# Patient Record
Sex: MALE | Race: WHITE | NOT HISPANIC OR LATINO | URBAN - METROPOLITAN AREA
[De-identification: names, ages, dates, MRNs, and addresses within clinical notes are randomized per-mention and may not be internally consistent; named-entity substitution may affect disease eponyms.]

---

## 2020-06-03 ENCOUNTER — LAB OUTSIDE AN ENCOUNTER (OUTPATIENT)
Dept: URBAN - METROPOLITAN AREA CLINIC 115 | Facility: CLINIC | Age: 75
End: 2020-06-03

## 2020-06-03 ENCOUNTER — OFFICE VISIT (OUTPATIENT)
Dept: URBAN - METROPOLITAN AREA CLINIC 115 | Facility: CLINIC | Age: 75
End: 2020-06-03
Payer: MEDICARE

## 2020-06-03 DIAGNOSIS — D64.89 ANEMIA DUE TO OTHER CAUSE: ICD-10-CM

## 2020-06-03 DIAGNOSIS — R18.8 ASCITES: ICD-10-CM

## 2020-06-03 PROCEDURE — G9903 PT SCRN TBCO ID AS NON USER: HCPCS | Performed by: INTERNAL MEDICINE

## 2020-06-03 PROCEDURE — G8417 CALC BMI ABV UP PARAM F/U: HCPCS | Performed by: INTERNAL MEDICINE

## 2020-06-03 PROCEDURE — G8753 SYS BP > OR = 140: HCPCS | Performed by: INTERNAL MEDICINE

## 2020-06-03 PROCEDURE — G8427 DOCREV CUR MEDS BY ELIG CLIN: HCPCS | Performed by: INTERNAL MEDICINE

## 2020-06-03 PROCEDURE — 99214 OFFICE O/P EST MOD 30 MIN: CPT | Performed by: INTERNAL MEDICINE

## 2020-06-03 RX ORDER — SPIRONOLACTONE 25 MG/1
TABLET ORAL
Qty: 0 | Refills: 0 | Status: ACTIVE | COMMUNITY
Start: 1900-01-01

## 2020-06-03 RX ORDER — METFORMIN HYDROCHLORIDE 500 MG/1
TABLET, COATED ORAL
Qty: 0 | Refills: 0 | Status: ACTIVE | COMMUNITY
Start: 1900-01-01

## 2020-06-03 RX ORDER — RIOCIGUAT 1 MG/1
TABLET, FILM COATED ORAL
Qty: 0 | Refills: 0 | Status: ACTIVE | COMMUNITY
Start: 1900-01-01

## 2020-06-03 RX ORDER — ASPIRIN 81 MG/1
TABLET, CHEWABLE ORAL
Qty: 0 | Refills: 0 | Status: ACTIVE | COMMUNITY
Start: 1900-01-01

## 2020-06-03 RX ORDER — ALLOPURINOL 100 MG/1
TABLET ORAL
Qty: 0 | Refills: 0 | Status: ACTIVE | COMMUNITY
Start: 1900-01-01

## 2020-06-03 RX ORDER — FUROSEMIDE 40 MG/1
TABLET ORAL
Qty: 0 | Refills: 0 | Status: ACTIVE | COMMUNITY
Start: 1900-01-01

## 2020-06-03 RX ORDER — METOLAZONE 5 MG/1
TABLET ORAL
Qty: 0 | Refills: 0 | Status: ACTIVE | COMMUNITY
Start: 1900-01-01

## 2020-06-03 RX ORDER — RANOLAZINE 500 MG/1
TABLET, FILM COATED, EXTENDED RELEASE ORAL
Qty: 0 | Refills: 0 | Status: ACTIVE | COMMUNITY
Start: 1900-01-01

## 2020-06-03 RX ORDER — COLCHICINE 0.6 MG/1
TABLET, FILM COATED ORAL
Qty: 0 | Refills: 0 | Status: ACTIVE | COMMUNITY
Start: 1900-01-01

## 2020-06-03 NOTE — HPI-TODAY'S VISIT:
LAST SIX MONTH NOTHING NEW. HAVING ECHOCARDIOGRAM, DR CESPEDES IS DOING IT.  NO STOMACH ISSUE. NO BLEEDING. BLEEDING IN NOSE.  NO ABDOMINAL PAIN OR DISTENSION., NOTICED TO HAVE DISTENSION OF ABDOMEN, AND SOB, THINKS PARACENTESIS WILL HELP, LAST ONE IN [place].

## 2020-06-03 NOTE — PHYSICAL EXAM CHEST:
no lesions,  no deformities,  no traumatic injuries,  no significant scars are present,  chest wall non-tender,  no masses present,  tactile fremitus is normal, breathing is unlabored without accessory muscle use, normal breath sounds , no lesions,  no deformities,  no traumatic injuries,  no significant scars are present,  chest wall non-tender,  no masses present,  tactile fremitus is normal, breathing is unlabored without accessory muscle use, normal breath sounds

## 2020-06-04 LAB
BASO (ABSOLUTE): 0
BASOS: 1
EOS (ABSOLUTE): 0.2
EOS: 3
HEMATOCRIT: 30.1
HEMATOLOGY COMMENTS:: (no result)
HEMOGLOBIN: 9.2
IMMATURE CELLS: (no result)
IMMATURE GRANS (ABS): 0
IMMATURE GRANULOCYTES: 1
LYMPHS (ABSOLUTE): 0.9
LYMPHS: 15
MCH: 26.9
MCHC: 30.6
MCV: 88
MONOCYTES(ABSOLUTE): 0.7
MONOCYTES: 11
NEUTROPHILS (ABSOLUTE): 4.1
NEUTROPHILS: 69
NRBC: (no result)
PLATELETS: 146
RBC: 3.42
RDW: 16.2
WBC: 5.9

## 2020-12-04 ENCOUNTER — OUT OF OFFICE VISIT (OUTPATIENT)
Dept: URBAN - METROPOLITAN AREA MEDICAL CENTER 24 | Facility: MEDICAL CENTER | Age: 75
End: 2020-12-04
Payer: MEDICARE

## 2020-12-04 DIAGNOSIS — D64.89 ANEMIA DUE TO OTHER CAUSE: ICD-10-CM

## 2020-12-04 DIAGNOSIS — R79.1 ABNORMAL BLEEDING TIME: ICD-10-CM

## 2020-12-04 DIAGNOSIS — R18.8 ABDOMINAL FLUID COLLECTION: ICD-10-CM

## 2020-12-04 DIAGNOSIS — Z79.01 ANTICOAGULANT LONG-TERM USE: ICD-10-CM

## 2020-12-04 PROCEDURE — G8427 DOCREV CUR MEDS BY ELIG CLIN: HCPCS | Performed by: INTERNAL MEDICINE

## 2020-12-04 PROCEDURE — 99222 1ST HOSP IP/OBS MODERATE 55: CPT | Performed by: INTERNAL MEDICINE

## 2020-12-09 ENCOUNTER — TELEPHONE ENCOUNTER (OUTPATIENT)
Dept: URBAN - METROPOLITAN AREA CLINIC 82 | Facility: CLINIC | Age: 75
End: 2020-12-09

## 2020-12-21 ENCOUNTER — LAB OUTSIDE AN ENCOUNTER (OUTPATIENT)
Dept: URBAN - METROPOLITAN AREA CLINIC 82 | Facility: CLINIC | Age: 75
End: 2020-12-21

## 2020-12-21 ENCOUNTER — OFFICE VISIT (OUTPATIENT)
Dept: URBAN - METROPOLITAN AREA CLINIC 82 | Facility: CLINIC | Age: 75
End: 2020-12-21
Payer: MEDICARE

## 2020-12-21 ENCOUNTER — DASHBOARD ENCOUNTERS (OUTPATIENT)
Age: 75
End: 2020-12-21

## 2020-12-21 VITALS
RESPIRATION RATE: 16 BRPM | SYSTOLIC BLOOD PRESSURE: 102 MMHG | HEART RATE: 51 BPM | BODY MASS INDEX: 31.4 KG/M2 | WEIGHT: 231.8 LBS | HEIGHT: 72 IN | TEMPERATURE: 97.5 F | DIASTOLIC BLOOD PRESSURE: 65 MMHG

## 2020-12-21 DIAGNOSIS — I50.810 RIGHT HEART FAILURE: ICD-10-CM

## 2020-12-21 DIAGNOSIS — R18.8 ASCITES: ICD-10-CM

## 2020-12-21 DIAGNOSIS — K92.2 GI BLEEDING: ICD-10-CM

## 2020-12-21 DIAGNOSIS — N17.9 AKI (ACUTE KIDNEY INJURY): ICD-10-CM

## 2020-12-21 PROBLEM — 166603001 LIVER FUNCTION TESTS ABNORMAL: Status: ACTIVE | Noted: 2020-06-04

## 2020-12-21 PROBLEM — 389026000 ASCITES: Status: ACTIVE | Noted: 2020-06-03

## 2020-12-21 PROBLEM — 302215000 THROMBOCYTOPENIA: Status: ACTIVE | Noted: 2020-06-04

## 2020-12-21 PROBLEM — 128404006 RIGHT HEART FAILURE: Status: ACTIVE | Noted: 2020-06-03

## 2020-12-21 PROBLEM — 80515008 HEPATOMEGALY: Status: ACTIVE | Noted: 2020-06-04

## 2020-12-21 PROCEDURE — G9903 PT SCRN TBCO ID AS NON USER: HCPCS | Performed by: INTERNAL MEDICINE

## 2020-12-21 PROCEDURE — 3017F COLORECTAL CA SCREEN DOC REV: CPT | Performed by: INTERNAL MEDICINE

## 2020-12-21 PROCEDURE — G8427 DOCREV CUR MEDS BY ELIG CLIN: HCPCS | Performed by: INTERNAL MEDICINE

## 2020-12-21 PROCEDURE — 99214 OFFICE O/P EST MOD 30 MIN: CPT | Performed by: INTERNAL MEDICINE

## 2020-12-21 PROCEDURE — G8482 FLU IMMUNIZE ORDER/ADMIN: HCPCS | Performed by: INTERNAL MEDICINE

## 2020-12-21 PROCEDURE — 1036F TOBACCO NON-USER: CPT | Performed by: INTERNAL MEDICINE

## 2020-12-21 RX ORDER — METFORMIN HYDROCHLORIDE 500 MG/1
TABLET, COATED ORAL
Qty: 0 | Refills: 0 | Status: ACTIVE | COMMUNITY
Start: 1900-01-01

## 2020-12-21 RX ORDER — RANOLAZINE 500 MG/1
TABLET, FILM COATED, EXTENDED RELEASE ORAL
Qty: 0 | Refills: 0 | Status: ACTIVE | COMMUNITY
Start: 1900-01-01

## 2020-12-21 RX ORDER — COLCHICINE 0.6 MG/1
TABLET, FILM COATED ORAL
Qty: 0 | Refills: 0 | Status: ACTIVE | COMMUNITY
Start: 1900-01-01

## 2020-12-21 RX ORDER — SPIRONOLACTONE 25 MG/1
TABLET ORAL
Qty: 0 | Refills: 0 | Status: ACTIVE | COMMUNITY
Start: 1900-01-01

## 2020-12-21 RX ORDER — METOLAZONE 5 MG/1
TABLET ORAL
Qty: 0 | Refills: 0 | Status: ACTIVE | COMMUNITY
Start: 1900-01-01

## 2020-12-21 RX ORDER — FUROSEMIDE 40 MG/1
TABLET ORAL
Qty: 0 | Refills: 0 | Status: ACTIVE | COMMUNITY
Start: 1900-01-01

## 2020-12-21 RX ORDER — RIOCIGUAT 1 MG/1
TABLET, FILM COATED ORAL
Qty: 0 | Refills: 0 | Status: ACTIVE | COMMUNITY
Start: 1900-01-01

## 2020-12-21 RX ORDER — ASPIRIN 81 MG/1
TABLET, CHEWABLE ORAL
Qty: 0 | Refills: 0 | Status: ACTIVE | COMMUNITY
Start: 1900-01-01

## 2020-12-21 RX ORDER — ALLOPURINOL 100 MG/1
TABLET ORAL
Qty: 0 | Refills: 0 | Status: ACTIVE | COMMUNITY
Start: 1900-01-01

## 2020-12-21 NOTE — PHYSICAL EXAM CONSTITUTIONAL:
well developed, well nourished , in no acute distress , ambulating without difficulty , normal communication ability , well developed, well nourished , in no acute distress , ambulating without difficulty , normal communication ability  sick looking

## 2020-12-21 NOTE — HPI-TODAY'S VISIT:
This is 75-year-old pleasant white male who came here for follow-up after recent hospitalization.  He was in the hospital for refractory ascites and right-sided heart failure status post paracentesis he has a refractory right-sided heart failure and pulmonary hypertension with chronic atrial fibrillation on Coumadin and coronary artery disease and chronic renal failure.  He has had sciatic fluid was showing a WBC of 335-a red blood cell was 6000 and.  His total protein was 3.9 it was a hemorrhagic ascites.  His albumin was 2.3.  His blood test read showing hemoglobin 8.8 platelet was normal potassium 3.2 INR 1.5.  He came here today with his wife for his noticed to have a worsening of ascites wants to have paracentesis done after the new year.  I have discussed with Dr. Solano will do stop her Coumadin 4 days prior to the procedure.  Patient understand and Dr. Heredia understands.  Patient also understand and his wife understand that worsening of ascites that needs to have progression of the disease and there are very limited alternatives.  We will also ask radiologist if there is any other alternative for repeated paracentesis.

## 2020-12-21 NOTE — PHYSICAL EXAM CHEST:
no lesions,  no deformities,  no traumatic injuries,  no significant scars are present,  chest wall non-tender,  no masses present,  tactile fremitus is normal, breathing is unlabored without accessory muscle use, normal breath sounds , no lesions,  no deformities,  no traumatic injuries,  no significant scars are present,  chest wall non-tender,  no masses present,  tactile fremitus is normal, breathing is unlabored without accessory muscle use, normal breath sounds  bilateral crackles